# Patient Record
(demographics unavailable — no encounter records)

---

## 2025-02-24 NOTE — DATA REVIEWED
[de-identified] : MRI brain without contrast  - nonspecific asymmetric left nasolicrimal duct fluid may be within normal limits, however, consider correlation for nasolacrimal duct obstruction given reported left-sided symptoms.

## 2025-02-24 NOTE — HISTORY OF PRESENT ILLNESS
[FreeTextEntry1] : possible trigeminal neuralgia [de-identified] : 30 year old female who had previously undergone 2 nasal surgeries after she broke her nose (the last one was performed in 2018, has undergone multiple nasal endoscopies which have been unremarkable), reports that when she presses on the left side of her face, she experiences piercing facial pain.  This pain started in June 2024 where she suddenly developed piercing pain in her left sinus area (without applying pressure) that lasted for a few seconds and then resolved. She also endorses that was she was experiencing tooth sensitivity. She went to her dentist and is now s/p root canal of the L canine, performed in August 2024. She reports that her facial pain has improved since the root canal, but has not completely subsided. She endorses that different facial expressions will aggravate the pain.   Saw neurologist in 11/24, performed MRI (will bring disc), recommended she go back to ENT. Underwent nasal endoscopy, negative. She returned to her neurologist who reported that this is most likely trigeminal neuralgia. Has not started on any medication.

## 2025-02-24 NOTE — DATA REVIEWED
[de-identified] : MRI brain without contrast  - nonspecific asymmetric left nasolicrimal duct fluid may be within normal limits, however, consider correlation for nasolacrimal duct obstruction given reported left-sided symptoms.

## 2025-02-24 NOTE — ASSESSMENT
[FreeTextEntry1] : 30 year old female s/p 2 nasal surgeries and multiple nasal endoscopies, presenting with persistent and severe left facial pain. She has undergone evaluation by her ENT as well as her neurologist.   - recommend MRI brain with and without with FIESTA and ITZEL - will order trial of gabapentin 100mg TID; instructed her if there is no improvement after 1 week of use, okay to discontinue - RTC after imaging completed   Patient and patient's family verbalize agreement and understanding with plan of care.   I, Dr. Cid, personally performed the evaluation and management (E/M) services for this new patient. That E/M includes conducting the initial examination, assessing all conditions, and establishing the plan of care. Today, my ACP, Garrick Buckner, was here to observe my evaluation and management services for this patient to be followed going forward.

## 2025-02-24 NOTE — HISTORY OF PRESENT ILLNESS
[FreeTextEntry1] : possible trigeminal neuralgia [de-identified] : 30 year old female who had previously undergone 2 nasal surgeries after she broke her nose (the last one was performed in 2018, has undergone multiple nasal endoscopies which have been unremarkable), reports that when she presses on the left side of her face, she experiences piercing facial pain.  This pain started in June 2024 where she suddenly developed piercing pain in her left sinus area (without applying pressure) that lasted for a few seconds and then resolved. She also endorses that was she was experiencing tooth sensitivity. She went to her dentist and is now s/p root canal of the L canine, performed in August 2024. She reports that her facial pain has improved since the root canal, but has not completely subsided. She endorses that different facial expressions will aggravate the pain.   Saw neurologist in 11/24, performed MRI (will bring disc), recommended she go back to ENT. Underwent nasal endoscopy, negative. She returned to her neurologist who reported that this is most likely trigeminal neuralgia. Has not started on any medication.

## 2025-02-24 NOTE — DATA REVIEWED
[de-identified] : MRI brain without contrast  - nonspecific asymmetric left nasolicrimal duct fluid may be within normal limits, however, consider correlation for nasolacrimal duct obstruction given reported left-sided symptoms.

## 2025-02-24 NOTE — DATA REVIEWED
[de-identified] : MRI brain without contrast  - nonspecific asymmetric left nasolicrimal duct fluid may be within normal limits, however, consider correlation for nasolacrimal duct obstruction given reported left-sided symptoms.

## 2025-02-24 NOTE — HISTORY OF PRESENT ILLNESS
[FreeTextEntry1] : possible trigeminal neuralgia [de-identified] : 30 year old female who had previously undergone 2 nasal surgeries after she broke her nose (the last one was performed in 2018, has undergone multiple nasal endoscopies which have been unremarkable), reports that when she presses on the left side of her face, she experiences piercing facial pain.  This pain started in June 2024 where she suddenly developed piercing pain in her left sinus area (without applying pressure) that lasted for a few seconds and then resolved. She also endorses that was she was experiencing tooth sensitivity. She went to her dentist and is now s/p root canal of the L canine, performed in August 2024. She reports that her facial pain has improved since the root canal, but has not completely subsided. She endorses that different facial expressions will aggravate the pain.   Saw neurologist in 11/24, performed MRI (will bring disc), recommended she go back to ENT. Underwent nasal endoscopy, negative. She returned to her neurologist who reported that this is most likely trigeminal neuralgia. Has not started on any medication.

## 2025-02-24 NOTE — REASON FOR VISIT
[New Patient Visit] : a new patient visit [Referred By: _________] : Patient was referred by SAMIR [FreeTextEntry1] : Atypical facial pain

## 2025-02-24 NOTE — HISTORY OF PRESENT ILLNESS
[FreeTextEntry1] : possible trigeminal neuralgia [de-identified] : 30 year old female who had previously undergone 2 nasal surgeries after she broke her nose (the last one was performed in 2018, has undergone multiple nasal endoscopies which have been unremarkable), reports that when she presses on the left side of her face, she experiences piercing facial pain.  This pain started in June 2024 where she suddenly developed piercing pain in her left sinus area (without applying pressure) that lasted for a few seconds and then resolved. She also endorses that was she was experiencing tooth sensitivity. She went to her dentist and is now s/p root canal of the L canine, performed in August 2024. She reports that her facial pain has improved since the root canal, but has not completely subsided. She endorses that different facial expressions will aggravate the pain.   Saw neurologist in 11/24, performed MRI (will bring disc), recommended she go back to ENT. Underwent nasal endoscopy, negative. She returned to her neurologist who reported that this is most likely trigeminal neuralgia. Has not started on any medication.

## 2025-02-24 NOTE — DATA REVIEWED
[de-identified] : MRI brain without contrast  - nonspecific asymmetric left nasolicrimal duct fluid may be within normal limits, however, consider correlation for nasolacrimal duct obstruction given reported left-sided symptoms.

## 2025-02-24 NOTE — HISTORY OF PRESENT ILLNESS
[FreeTextEntry1] : possible trigeminal neuralgia [de-identified] : 30 year old female who had previously undergone 2 nasal surgeries after she broke her nose (the last one was performed in 2018, has undergone multiple nasal endoscopies which have been unremarkable), reports that when she presses on the left side of her face, she experiences piercing facial pain.  This pain started in June 2024 where she suddenly developed piercing pain in her left sinus area (without applying pressure) that lasted for a few seconds and then resolved. She also endorses that was she was experiencing tooth sensitivity. She went to her dentist and is now s/p root canal of the L canine, performed in August 2024. She reports that her facial pain has improved since the root canal, but has not completely subsided. She endorses that different facial expressions will aggravate the pain.   Saw neurologist in 11/24, performed MRI (will bring disc), recommended she go back to ENT. Underwent nasal endoscopy, negative. She returned to her neurologist who reported that this is most likely trigeminal neuralgia. Has not started on any medication.